# Patient Record
(demographics unavailable — no encounter records)

---

## 2024-11-25 NOTE — DISCUSSION/SUMMARY
[FreeTextEntry1] : This is a 17-month-old male child that is here today with ongoing fever and cough for the past 3 days.  Mom states that temperature max is 102.  Mom states that the fever only comes down slightly with Tylenol or Motrin.  His physical examination today is positive for productive hacking cough and fine crackles on the upper right hand lung posteriorly.  There is minimal wheezing noted crackles cleared with cough.  There is no retractions and his oxygen saturation is 98%.  He appears comfortable based on clinical presentation he was diagnosed with clinical pneumonia and placed on Zithromax.  Mom to continue saline nebulization treatments and if needed may start albuterol if respiratory rate increases or wheezing audible.  Follow-up in 4 days for a lung check . should child develop any signs of respiratory difficulty to take child to the nearest emergency room for further evaluation and treatment.  Nasal swab obtained as well.  Will discuss results with mom once available. Total time dedicated to this patient's visit includes preparing to see patient  obtaining and/or reviewing separately obtained history from patient and parent,  discussing symptoms ,  physical exam and medication recommendations Time :30

## 2024-11-25 NOTE — HISTORY OF PRESENT ILLNESS
[FreeTextEntry6] : This is a  17 month old with cough and congestion and  fever 102.7  for the last 3 days , No vomiting or diarrhea Mom states that older sibling at home has similar symptoms she was seen in our office 1 week ago and placed on antibiotics.

## 2024-11-25 NOTE — PHYSICAL EXAM
[Conjuctival Injection] : conjunctival injection [Mucoid Discharge] : mucoid discharge [Crackles] : crackles [Transmitted Upper Airway Sounds] : transmitted upper airway sounds [NL] : warm, clear [FreeTextEntry7] : Fine crackles heard in upper right lung cleared with cough.  There is only occasional wheeze audible.  No retractions noted and O2 sat is 98%.  No labored breathing noted

## 2024-11-25 NOTE — REVIEW OF SYSTEMS
[Fever] : fever [Nasal Discharge] : nasal discharge [Nasal Congestion] : nasal congestion [Cough] : cough [Congestion] : congestion [Negative] : Skin

## 2024-11-29 NOTE — DISCUSSION/SUMMARY
[FreeTextEntry1] : Patient has been diagnosed with a viral Upper respiratory infection due to RSV infection and rhino enterovirus. Patient has been improving since last visit to this office.  Cough has lessened more in the morning and evening.  Patient had 1 dusky episode yesterday in the car lips looked dusky however he has been acting perfectly normal and has been afebrile for the last few days. Patient is a currently afebrile. Patient has few crackles heard anterior upper lung field back is clear occasional expiratory wheeze no retractions he is happy and playful. Parent has been advised to use saline nose drops followed with a nasal aspirator in order to help in alleviating symptoms . Lakisha head should be elevated during sleep and steam shower or cool mist vaporizer used to help with the nasal congestion . Parent to monitor for fever or increased irritability . Should fever develop over 101  Tylenol may be administered . Should  fever persist for greater than 48 hrs, symptoms worsen  or irritability develop   parent advised to contact office for further evaluation. Patient to restart albuterol twice daily x 2 to 3 days follow-up next week.  Advised mom if he is completely back to normal then can cancel appointment. Total time dedicated to this patient visit , including preparing to see the patient ( eg.. Review of chart, any pertinent labs ect  ) obtaining and/ or  reviewing separately obtained history, performing medical exam,  evaluation, counseling and educating patient and family member, ordering any needed medication or labs and documenting clinical information in  the electronic medical record to patient / parent ___20____ minutes.

## 2024-11-29 NOTE — HISTORY OF PRESENT ILLNESS
Patient requesting to speak to PCP regarding on going medical issues. [FreeTextEntry6] : 17 m/o patient being followed up for RSV, Mother states patient still has a residual cough and runny nose. Afebrile in office.

## 2024-12-03 NOTE — HISTORY OF PRESENT ILLNESS
[Normal] : Normal [Water heater temperature set at <120 degrees F] : Water heater temperature set at <120 degrees F [Car seat in back seat] : Car seat in back seat [Carbon Monoxide Detectors] : Carbon monoxide detectors [Smoke Detectors] : Smoke detectors [Father] : father [Cow's milk (Ounces per day ___)] : consumes [unfilled] oz of Cow's milk per day [Fruit] : fruit [Vegetables] : vegetables [Meat] : meat [Cereal] : cereal [Eggs] : eggs [Table food] : table food [___ stools per day] : [unfilled]  stools per day [___ voids per day] : [unfilled] voids per day [In crib] : In crib [Sippy cup use] : Sippy cup use [No] : Patient does not go to dentist yearly [Vitamin] : Primary Fluoride Source: Vitamin [Playtime] : Playtime  [Temper Tantrums] : Temper Tantrums [Up to date] : Up to date [Vitamin ___] : Patient takes [unfilled] vitamin daily  [FreeTextEntry7] : 18-month-old here for routine visit.  He is status post RSV illnessstill on albuterol 2 x a day [de-identified] : is better, sp RSV dad states he still has loose productive cough has been better since on albuterol currently on albuterol twice daily [FreeTextEntry8] : emiliano [FreeTextEntry3] : 7p-7 am naps 1  2hrs

## 2024-12-03 NOTE — DEVELOPMENTAL MILESTONES
[Normal Development] : Normal Development [None] : none [Engages with others for play] : engages with others for play [Uses 6 to 10 words other than] : uses 6 to 10 words other than names [Identifies at least 2 body parts] : identifies at least 2 body parts [Walks up with 2 feet per step] : walks up with 2 feet per step with hand held [Sits in small chair] : sits in small chair [Carries toy while walking] : carries toy while walking [Scribbles spontaneously] : scribbles spontaneously [Throws small ball a few feet] : throws a small ball a few feet while standing [FreeTextEntry1] : 5-10 words,  hi bye banana , animal sounds,  follows directions. [Passed] : passed [Yes] : Completed.

## 2024-12-03 NOTE — PHYSICAL EXAM
[Alert] : alert [No Acute Distress] : no acute distress [Normocephalic] : normocephalic [Anterior [place] Closed] : anterior fontanelle closed [Red Reflex Bilateral] : red reflex bilateral [PERRL] : PERRL [Normally Placed Ears] : normally placed ears [Auricles Well Formed] : auricles well formed [Clear Tympanic membranes with present light reflex and bony landmarks] : clear tympanic membranes with present light reflex and bony landmarks [No Discharge] : no discharge [Nares Patent] : nares patent [Palate Intact] : palate intact [Uvula Midline] : uvula midline [Tooth Eruption] : tooth eruption  [Supple, full passive range of motion] : supple, full passive range of motion [No Palpable Masses] : no palpable masses [Symmetric Chest Rise] : symmetric chest rise [Clear to Auscultation Bilaterally] : clear to auscultation bilaterally [Regular Rate and Rhythm] : regular rate and rhythm [S1, S2 present] : S1, S2 present [No Murmurs] : no murmurs [+2 Femoral Pulses] : +2 femoral pulses [Soft] : soft [NonTender] : non tender [Non Distended] : non distended [Normoactive Bowel Sounds] : normoactive bowel sounds [No Hepatomegaly] : no hepatomegaly [No Splenomegaly] : no splenomegaly [Central Urethral Opening] : central urethral opening [Testicles Descended Bilaterally] : testicles descended bilaterally [Patent] : patent [Normally Placed] : normally placed [No Abnormal Lymph Nodes Palpated] : no abnormal lymph nodes palpated [No Clavicular Crepitus] : no clavicular crepitus [Symmetric Buttocks Creases] : symmetric buttocks creases [No Spinal Dimple] : no spinal dimple [NoTuft of Hair] : no tuft of hair [Cranial Nerves Grossly Intact] : cranial nerves grossly intact [No Rash or Lesions] : no rash or lesions [Nando 1] : Nando 1 [Circumcised] : circumcised [FreeTextEntry7] : Patient still has an occasional expiratory wheeze wet cough some rhonchi no retractions appears comfortable

## 2024-12-03 NOTE — DISCUSSION/SUMMARY
[Normal Growth] : growth [Normal Development] : development [None] : No known medical problems [No Elimination Concerns] : elimination [No Feeding Concerns] : feeding [No Skin Concerns] : skin [Normal Sleep Pattern] : sleep [No Medications] : ~He/She~ is not on any medications [Parent/Guardian] : parent/guardian [] : The components of the vaccine(s) to be administered today are listed in the plan of care. The disease(s) for which the vaccine(s) are intended to prevent and the risks have been discussed with the caretaker.  The risks are also included in the appropriate vaccination information statements which have been provided to the patient's caregiver.  The caregiver has given consent to vaccinate. [Family Support] : family support [Child Development and Behavior] : child development and behavior [Language Promotion/Hearing] : language promotion/hearing [[place] Training Readiness] : toliet training readiness [Safety] : safety [FreeTextEntry1] :  Patient is here for an 18 month well visit. He is status post RSV infection.  He has been stable appears to be recovering slowly his physical exam today still is notable for mild expiratory wheezing at times and some scattered rhonchi loose productive cough.  He has no discomfort there are no retractions. Discussed exam with father will start steroids for 3 to 5 days prednisolone 3 mL twice daily to be started Follow-up in 1 to 2 weeks sooner if needed. Vaccines were held since he still is sick. Growth and development has been appropriate since last well visit. Continue whole cow's milk. Continue table foods, 3 meals with 2-3 snacks per day. Incorporate fluorinated water daily in a sippy cup. Brush teeth twice a day with soft toothbrush. Recommend visit to dentist. When in car, keep child in rear-facing car seats until age 2, or until  the maximum height and weight for seat is reached. Put toddler to sleep in own bed or crib. Help toddler to maintain consistent daily routines and sleep schedule. Toilet training discussed. Recognize anxiety in new settings. Ensure home is safe. Be within arm's reach of toddler at all times. Use consistent, positive discipline. Read aloud to toddler.  Immunization were discussed and HELD. Dtap #4 and Varivax #1 held recovering from RSV  Plan to return to office for 24 month well child visit and sooner if needed.

## 2024-12-03 NOTE — PHYSICAL EXAM
[Alert] : alert [No Acute Distress] : no acute distress [Normocephalic] : normocephalic [Anterior [place] Closed] : anterior fontanelle closed [Red Reflex Bilateral] : red reflex bilateral [PERRL] : PERRL [Normally Placed Ears] : normally placed ears [Auricles Well Formed] : auricles well formed [Clear Tympanic membranes with present light reflex and bony landmarks] : clear tympanic membranes with present light reflex and bony landmarks [No Discharge] : no discharge [Nares Patent] : nares patent [Palate Intact] : palate intact [Uvula Midline] : uvula midline [Tooth Eruption] : tooth eruption  [Supple, full passive range of motion] : supple, full passive range of motion [No Palpable Masses] : no palpable masses [Symmetric Chest Rise] : symmetric chest rise [Clear to Auscultation Bilaterally] : clear to auscultation bilaterally [Regular Rate and Rhythm] : regular rate and rhythm [S1, S2 present] : S1, S2 present [No Murmurs] : no murmurs [+2 Femoral Pulses] : +2 femoral pulses [Soft] : soft [NonTender] : non tender [Non Distended] : non distended [Normoactive Bowel Sounds] : normoactive bowel sounds [No Hepatomegaly] : no hepatomegaly [No Splenomegaly] : no splenomegaly [Central Urethral Opening] : central urethral opening [Testicles Descended Bilaterally] : testicles descended bilaterally [Patent] : patent [Normally Placed] : normally placed [No Abnormal Lymph Nodes Palpated] : no abnormal lymph nodes palpated [No Clavicular Crepitus] : no clavicular crepitus [Symmetric Buttocks Creases] : symmetric buttocks creases [No Spinal Dimple] : no spinal dimple [NoTuft of Hair] : no tuft of hair [Cranial Nerves Grossly Intact] : cranial nerves grossly intact [No Rash or Lesions] : no rash or lesions [Nando 1] : Nando 1 [Circumcised] : circumcised [FreeTextEntry7] : Patient still has an occasional expiratory wheeze wet cough some rhonchi no retractions appears comfortable

## 2024-12-03 NOTE — HISTORY OF PRESENT ILLNESS
[Normal] : Normal [Water heater temperature set at <120 degrees F] : Water heater temperature set at <120 degrees F [Car seat in back seat] : Car seat in back seat [Carbon Monoxide Detectors] : Carbon monoxide detectors [Smoke Detectors] : Smoke detectors [Father] : father [Cow's milk (Ounces per day ___)] : consumes [unfilled] oz of Cow's milk per day [Fruit] : fruit [Vegetables] : vegetables [Meat] : meat [Cereal] : cereal [Eggs] : eggs [Table food] : table food [___ stools per day] : [unfilled]  stools per day [___ voids per day] : [unfilled] voids per day [In crib] : In crib [Sippy cup use] : Sippy cup use [No] : Patient does not go to dentist yearly [Vitamin] : Primary Fluoride Source: Vitamin [Playtime] : Playtime  [Temper Tantrums] : Temper Tantrums [Up to date] : Up to date [Vitamin ___] : Patient takes [unfilled] vitamin daily  [FreeTextEntry7] : 18-month-old here for routine visit.  He is status post RSV illnessstill on albuterol 2 x a day [de-identified] : is better, sp RSV dad states he still has loose productive cough has been better since on albuterol currently on albuterol twice daily [FreeTextEntry8] : emiliano [FreeTextEntry3] : 7p-7 am naps 1  2hrs

## 2024-12-13 NOTE — HISTORY OF PRESENT ILLNESS
[FreeTextEntry6] : 18 m/o patient presents today for a f/u appointment for RSV. Broke out in a rash on the face and chest 2 days ago, dissipated on its own, still remnants on the face. No cough or fevers. Afebrile in office. prednisone 3 day  albuterol  bid   for 7 days   all meds stopped 8 days ago   rash on body  and diarrhea  breathing  normal  sleeping and eating normal

## 2024-12-13 NOTE — PHYSICAL EXAM
[Playful] : playful [Nando: ____] : Nando [unfilled] [NL] : warm, clear [Wheezing] : no wheezing [Crackles] : no crackles [FreeTextEntry1] : eating goldfish crackers [FreeTextEntry6] : no diaper rash  [de-identified] : mac rash on trunk and legs nd face

## 2024-12-13 NOTE — DISCUSSION/SUMMARY
[FreeTextEntry1] : follow up RSV-  resolved secondary viral exanthum/ iollness- supportive care  answer mom questions

## 2025-01-29 NOTE — HISTORY OF PRESENT ILLNESS
[FreeTextEntry6] : This is a 19-month-old who has had URI symptoms approximately 1 week with congestion and cough. He is afebrile. Patient was to have vaccines today.  Mom states has slightly cloudy to yellow discharge occasionally clear cough is productive.  He has a history of otitis media status post RSV 2 months ago.

## 2025-01-29 NOTE — DISCUSSION/SUMMARY
[FreeTextEntry1] : This patient has been diagnosed with a Viral Syndrome./URI/cough Supportive care was discussed Patient looks like he is acutely fighting off a viral illness. Vaccines were held at this time mom will return in 1 to 2 weeks. The Parent was advised to use saline nose drops and symptomatic relief if indicated to alleviate symptoms.  May use OTC products if age appropriate. Advised to encourage fluids and to monitor for fever. Should temperature develop and symptoms worsen or fail to improve over the next 48-72 hours parents are to contact the office.for further evaluation.   Total time dedicated to this patient visit including preparing to see the patient ( eg.. Review of chart, any pertinent labs ect ) obtaining and/ or reviewing separately obtained history, performing medical exam, evaluation, counseling and educating patient and family member, ordering any needed medication or labs and documenting clinical information in the electronic medical record to patient / parent __20____ minutes.

## 2025-02-13 NOTE — REVIEW OF SYSTEMS
[Nasal Congestion] : nasal congestion [FreeTextEntry1] : Redness to the exterior bulbous part of the nose

## 2025-02-13 NOTE — PHYSICAL EXAM
[Clear] : right tympanic membrane clear [NL] : warm, clear [FreeTextEntry4] : Slight clear nasal discharge, bulbous part of the nose is red there is no bruising no swelling nose is midline there is no step-off teeth intact no chipping [de-identified] : Frenulum is intact bruising has resolved teeth appear within normal limits

## 2025-02-13 NOTE — DISCUSSION/SUMMARY
[FreeTextEntry1] : 20-month-old here for follow-up on URI and immunizations if well.   URI symptoms have improved whole family got sick after his last visit. Patient also fell on his face has redness to his nose mom stated this occurred a few days ago and also had bruising of the frenulum. Appears to be doing well Nose is erythematous-examination within normal limits there is no step-off no blood on the inside of the mucosa Frenulum intact no bruising no bleeding teeth look within normal limits Patient received DTaP #4 and varicella #1 VIS forms given side effects of vaccines discussed with parent Return for 2-year-old visit. Total time dedicated to this patient visit , including preparing to see the patient ( eg.. Review of chart, any pertinent labs ect  ) obtaining and/ or  reviewing separately obtained history, performing medical exam,  evaluation, counseling and educating patient and family member, ordering any needed medication or labs and documenting clinical information in  the electronic medical record to patient / parent ___20____ minutes.

## 2025-02-13 NOTE — HISTORY OF PRESENT ILLNESS
[FreeTextEntry6] : 20-month here for follow-up on URI and immunizations. Mom also wants him evaluated stated 2 days ago he fell on his nose nose continues to be red and slight bruising Patient also had bruising to the upper frenulum He appears well Does have redness to the bulbous part of his nose Still nasal congestion but appears to be doing better

## 2025-03-28 NOTE — HISTORY OF PRESENT ILLNESS
[FreeTextEntry6] : 21 m/o patient presents today for a f/u appointment for URI sinusitis treated with antibiotics.  He was not seen in the office due to him being exposed to measles and he was in isolation for 21 days.  Already finished quarantining. No fevers. Afebrile in office.  Mom states after 48 hours of antibiotics he seems so much better less discharge currently within normal limits no coughing.

## 2025-03-28 NOTE — DISCUSSION/SUMMARY
[FreeTextEntry1] : 21-month-old here for follow-up on URI sinusitis treated with amoxicillin.  Patient completed full course. Just completed quarantine for measles exposure he did have his MMR vaccine. Mom states within 48 hours symptoms started to improve he has no discharge no coughing at this time and appears to be doing much better. Physical exam within normal limits Follow-up as needed Total time dedicated to this patient visit , including preparing to see the patient ( eg.. Review of chart, any pertinent labs ect  ) obtaining and/ or  reviewing separately obtained history, performing medical exam,  evaluation, counseling and educating patient and family member, ordering any needed medication or labs and documenting clinical information in  the electronic medical record to patient / parent _____20__ minutes.

## 2025-05-15 NOTE — PHYSICAL EXAM
[Clear] : right tympanic membrane clear [NL] : warm, clear [FreeTextEntry3] : TMs clear bilaterally good landmarks no fluid [de-identified] : Teething primary molar left upper in all 4 I teeth

## 2025-05-15 NOTE — DISCUSSION/SUMMARY
[FreeTextEntry1] : This patient presents with teething syndrome. some left ear tugging. had fever 102, last pm 1 x, no uri sx  no v/d. Recommend acetaminophen or ibuprofen prn. at HS. physical exam is wnl, tms are clear bl is teething left 1 molar and all 4 eye teeth. Symptomatic relief with cool teething rings. Apply cold or warm compress to gums. Reassurance given to parents. Total time dedicated to this patient visit , including preparing to see the patient ( eg.. Review of chart, any pertinent labs ect  ) obtaining and/ or  reviewing separately obtained history, performing medical exam,  evaluation, counseling and educating patient and family member, ordering any needed medication or labs and documenting clinical information in  the electronic medical record to patient / parent ___20____ minutes.

## 2025-05-15 NOTE — HISTORY OF PRESENT ILLNESS
[FreeTextEntry6] : 23 m/o patient presents with fever highest 102 and pulling left ear x 1 day. Patient is febrile in office. no uri sx , no v/d

## 2025-05-20 NOTE — DISCUSSION/SUMMARY
[FreeTextEntry1] : This is a 23-month-old who had viral type symptoms last week was seen in office then diagnosed with probable viral etiology. Mom states today noted swelling of the left side of his face and he was complaining of left ear pain.  He has had low-grade fever 99 since last week Has mild URI symptoms. On today's examination tympanic membranes are both normal he does have swelling to the left side of his cheek consistent with a parotitis. Patient also has 1 small tender adenopathy submandibular area Patient was given prescription for amylase lipase mumps titers RVP was sent And CBC. Patient does have tender lymph nodes so we will cover with antibiotics in case this is an adenitis. Patient placed on Augmentin three quarters of a teaspoon twice daily x 10 days. Will continue with observation at this time To use ibuprofen for pain and swelling Will notify office if symptoms worsen Total time dedicated to this patient visit , including preparing to see the patient ( eg.. Review of chart, any pertinent labs ect  ) obtaining and/ or  reviewing separately obtained history, performing medical exam,  evaluation, counseling and educating patient and family member, ordering any needed medication or labs and documenting clinical information in  the electronic medical record to patient / parent _____30__ minutes.

## 2025-05-20 NOTE — PHYSICAL EXAM
[Clear] : right tympanic membrane clear [Mucoid Discharge] : mucoid discharge [NL] : warm, clear [FreeTextEntry2] : Patient has left-sided facial swelling anterior to ear he is tender to palpation [de-identified] : Patient has no firmness under her tongue to salivary glands [de-identified] : Small tender submandibular lymph node

## 2025-05-20 NOTE — REVIEW OF SYSTEMS
[Nasal Congestion] : nasal congestion [Negative] : Genitourinary [FreeTextEntry1] : Left facial swelling anterior to the left ear

## 2025-05-20 NOTE — HISTORY OF PRESENT ILLNESS
[FreeTextEntry6] : 23 m/o patient presents with low grade fever around , rash on left side of face, left ear pain, cough and congestion x 3 days. Patient is afebrile in office.  Patient does have some yellow discharge Mom stated was complaining of left-sided ear pain and also noted today suddenly had swelling in the left side of his face.

## 2025-06-03 NOTE — DISCUSSION/SUMMARY
[FreeTextEntry1] : 24 month male here for well-visit, appropriate growth and development observed.   Continue cow's milk. Continue table foods, 3 meals with 2-3 snacks per day. Incorporate fluorinated water daily in a Sippy cup. Brush teeth twice a day with soft toothbrush. Recommend visit to dentist. When in car, keep child in rear-facing car seats until age 2, or until the maximum height and weight for seat is reached. Put toddler to sleep in own bed. Help toddler to maintain consistent daily routines and sleep schedule. Toilet training discussed, CAN BE SUCESSFUL BETWEEN 2-3 YEARS OF AGE. Ensure home is safe. Use consistent, positive discipline. Read aloud to toddler. Limit screen time to no more than 2 hours per day.   Infant received Hep A#1 vaccination today,  Vis forms were given to parents. All questions answered regarding this visit. Patient to return for routine office visit in 6 months.

## 2025-06-03 NOTE — HISTORY OF PRESENT ILLNESS
[At risk for exposure to TB] : Not at risk for exposure to Tuberculosis [FreeTextEntry7] : This patient has been healthy. They have had no ER or specialist visits this past year.